# Patient Record
Sex: MALE | Race: WHITE | Employment: UNEMPLOYED | URBAN - METROPOLITAN AREA
[De-identification: names, ages, dates, MRNs, and addresses within clinical notes are randomized per-mention and may not be internally consistent; named-entity substitution may affect disease eponyms.]

---

## 2020-11-25 ENCOUNTER — HOSPITAL ENCOUNTER (EMERGENCY)
Age: 25
Discharge: HOME | End: 2020-11-25
Attending: EMERGENCY MEDICINE | Admitting: EMERGENCY MEDICINE
Payer: COMMERCIAL

## 2020-11-25 VITALS
DIASTOLIC BLOOD PRESSURE: 72 MMHG | WEIGHT: 175 LBS | RESPIRATION RATE: 15 BRPM | HEART RATE: 77 BPM | SYSTOLIC BLOOD PRESSURE: 137 MMHG | TEMPERATURE: 98.2 F | OXYGEN SATURATION: 98 % | BODY MASS INDEX: 23.73 KG/M2

## 2020-11-25 DIAGNOSIS — M62.838 CERVICAL PARASPINAL MUSCLE SPASM: Primary | ICD-10-CM

## 2020-11-25 PROCEDURE — 74011250636 HC RX REV CODE- 250/636: Performed by: EMERGENCY MEDICINE

## 2020-11-25 PROCEDURE — 74011250637 HC RX REV CODE- 250/637: Performed by: EMERGENCY MEDICINE

## 2020-11-25 PROCEDURE — 96372 THER/PROPH/DIAG INJ SC/IM: CPT

## 2020-11-25 PROCEDURE — 99283 EMERGENCY DEPT VISIT LOW MDM: CPT

## 2020-11-25 RX ORDER — CYCLOBENZAPRINE HCL 10 MG
10 TABLET ORAL
Status: COMPLETED | OUTPATIENT
Start: 2020-11-25 | End: 2020-11-25

## 2020-11-25 RX ORDER — NAPROXEN 500 MG/1
500 TABLET ORAL 2 TIMES DAILY WITH MEALS
Qty: 20 TAB | Refills: 0 | Status: SHIPPED | OUTPATIENT
Start: 2020-11-25 | End: 2020-12-05

## 2020-11-25 RX ORDER — KETOROLAC TROMETHAMINE 30 MG/ML
60 INJECTION, SOLUTION INTRAMUSCULAR; INTRAVENOUS
Status: COMPLETED | OUTPATIENT
Start: 2020-11-25 | End: 2020-11-25

## 2020-11-25 RX ORDER — CYCLOBENZAPRINE HCL 10 MG
10 TABLET ORAL
Qty: 9 TAB | Refills: 0 | Status: SHIPPED | OUTPATIENT
Start: 2020-11-25 | End: 2020-11-28

## 2020-11-25 RX ADMIN — KETOROLAC TROMETHAMINE 60 MG: 30 INJECTION, SOLUTION INTRAMUSCULAR at 09:24

## 2020-11-25 RX ADMIN — CYCLOBENZAPRINE 10 MG: 10 TABLET, FILM COATED ORAL at 09:24

## 2020-11-25 NOTE — ED PROVIDER NOTES
[address] [facility]  EMERGENCY DEPARTMENT HISTORY AND PHYSICAL EXAM       Date: 11/25/2020   Patient Name: Sukh Randolph   YOB: 1995  Medical Record Number: [id number]    HISTORY OF PRESENTING ILLNESS:     Sukh Randolph is a 26 y.o. male presenting with the noted PMH to the ED c/o right-sided neck pain. Patient states he woke up this morning with pain. He states anytime he moves his neck he feels a sharp pain. He states he felt a little better in the warm bath. He states he tried Tylenol's morning without relief. He denies any injury or trauma. He states that he did get stressed last night with his fiancée's soon-to-be ex- was having a dispute about the 3 boys being at his house. He got very angry. No one got injured. There is no assault. He states he did lose his license a week ago and a DUI. There was no accident. But it was stressed about that as well. He denies any chest pain or shortness of breath. Does report a smoker's cough unchanged. Denies any sore throat or nasal congestion. Denies any fevers or chills. Denies any nausea or vomiting. Denies any urine or bowel changes. Denies any abdominal pain. He has had pain before. He states he did see a spine and neck doctor back in 2015. He was told he had degenerative disc then. Rest of complete systems reviewed and negative    Primary Care Provider: Anna Gonsales MD   Specialist:    Past Medical History:   History reviewed. No pertinent past medical history. Past Surgical History:   History reviewed. No pertinent surgical history. Social History:   Social History     Tobacco Use    Smoking status: Current Every Day Smoker     Packs/day: 0.50   Substance Use Topics    Alcohol use: Yes     Comment: socially    Drug use: No        Allergies:    Allergies   Allergen Reactions    Latex Rash    Amoxicillin Rash    Morphine Rash        REVIEW OF SYSTEMS:  Review of Systems      PHYSICAL EXAM:  Vitals:    11/25/20 0852   BP: 137/72   Pulse: 77   Resp: 15   Temp: 98.2 °F (36.8 °C)   SpO2: 98%   Weight: 79.4 kg (175 lb)       Physical Exam   Vital signs reviewed. Alert oriented x 3 in NAD. HEENT: normocephalic atraumatic. Eyes are PERRLA EOMI. Conjunctiva normal.    External ears and nose normal.    Neck: normal external exam. No midline neck or back TTP. Right paraspinal tenderness. No skin changes. No step-off or crepitus. Lungs are clear to ascultation bilaterally. normal effort  Heart is regular rate and rhythm with no murmurs. Abdomen soft and nontender. No rebound rigidity or guarding. Extremities: Moves all 4 extremities and no distress. Full range of motion. 2+ pulses and BCR in all 4 extremities. Neuro: Normal gait. 5 out of 5 strength in all 4 extremities. No facial droop. Skin examination: intact. no rashes. No petechia or purpura. Medications   ketorolac (TORADOL) injection 60 mg (has no administration in time range)   cyclobenzaprine (FLEXERIL) tablet 10 mg (has no administration in time range)       RESULTS:    Labs -   Labs Reviewed - No data to display    Radiologic Studies -  No results found. MEDICAL DECISION MAKING    No midline neck or back tenderness. No step-offs or crepitus. No signs of fracture or dislocation. No evidence of cauda equina or transverse myelitis. Suspect more muscular spasm from stress. Patient aware. Patient to follow back up with neck and back specialist to be rechecked or come back if symptoms change or worsen. Patient states understanding and agrees with plan. Patient has no new complaints, changes, or physical findings. Results were reviewed with the patient. Pt's questions and concerns were addressed. Care plan was outlined, including follow-up with PCP/specialist and return precautions were discussed. Patient is felt to be stable for discharge at this time. Diagnosis   Clinical Impression:   1.  Cervical paraspinal muscle spasm           Follow-up Information     Follow up With Specialties Details Why Contact Info    Anson Houser MD Geriatric Medicine Call today  [phone]      [facility] EMERGENCY DEPT Emergency Medicine Go in 2 days If symptoms worsen, As needed [address]  [address] [facility]  Call today  [address] [facility]  [address]  [phone]          Current Discharge Medication List      START taking these medications    Details   !! naproxen (Naprosyn) 500 mg tablet Take 1 Tab by mouth two (2) times daily (with meals) for 10 days. As needed for pain  Qty: 20 Tab, Refills: 0      cyclobenzaprine (FLEXERIL) 10 mg tablet Take 1 Tab by mouth three (3) times daily as needed for Muscle Spasm(s) for up to 3 days. Qty: 9 Tab, Refills: 0       !! - Potential duplicate medications found. Please discuss with provider. CONTINUE these medications which have NOT CHANGED    Details   !! naproxen (NAPROSYN) 500 mg tablet Take 1 Tab by mouth two (2) times daily (with meals). Qty: 14 Tab, Refills: 0       !! - Potential duplicate medications found. Please discuss with provider. Discharged in stable and improved condition. This chart was completed using Dragon, a dictation transcription service. Errors may have resulted from using this device.

## 2020-11-25 NOTE — ED NOTES
I have reviewed discharge instructions with the patient. The patient verbalized understanding.   Patient armband removed and shredded  Pt ambulatory to [facility]

## 2020-11-25 NOTE — ED TRIAGE NOTES
P ambulatory to Bed1 for neck and back pain. Pt states he woke up and felt spasms. Denies injury/trauma. Denies leg involvement.